# Patient Record
Sex: MALE | Race: BLACK OR AFRICAN AMERICAN | NOT HISPANIC OR LATINO | Employment: STUDENT | URBAN - NONMETROPOLITAN AREA
[De-identification: names, ages, dates, MRNs, and addresses within clinical notes are randomized per-mention and may not be internally consistent; named-entity substitution may affect disease eponyms.]

---

## 2018-02-10 ENCOUNTER — OFFICE VISIT (OUTPATIENT)
Dept: RETAIL CLINIC | Facility: CLINIC | Age: 23
End: 2018-02-10

## 2018-02-10 VITALS
OXYGEN SATURATION: 94 % | RESPIRATION RATE: 22 BRPM | TEMPERATURE: 100.5 F | HEART RATE: 88 BPM | DIASTOLIC BLOOD PRESSURE: 60 MMHG | WEIGHT: 178 LBS | SYSTOLIC BLOOD PRESSURE: 102 MMHG

## 2018-02-10 DIAGNOSIS — J20.9 ACUTE BRONCHITIS, UNSPECIFIED ORGANISM: ICD-10-CM

## 2018-02-10 DIAGNOSIS — R68.89 FLU-LIKE SYMPTOMS: Primary | ICD-10-CM

## 2018-02-10 PROCEDURE — 99212 OFFICE O/P EST SF 10 MIN: CPT | Performed by: NURSE PRACTITIONER

## 2018-02-10 NOTE — PROGRESS NOTES
Chief Complaint   Patient presents with   • URI     Subjective   Tyler Majano is a 23 y.o. male who presents to the clinic today with complaints cough and fever. He started getting sick on Monday, but now worse.   URI    This is a new problem. The current episode started in the past 7 days. The problem has been gradually worsening. The maximum temperature recorded prior to his arrival was 100.4 - 100.9 F. Associated symptoms include chest pain (burning sensation with cough. ), congestion (nasal), coughing, headaches, a sore throat and wheezing. Pertinent negatives include no abdominal pain, diarrhea, dysuria, ear pain, joint pain, joint swelling, nausea, neck pain, plugged ear sensation, rash, rhinorrhea, sneezing, swollen glands or vomiting. He has tried decongestant and acetaminophen for the symptoms. The treatment provided mild relief.       No current outpatient prescriptions on file.    Allergies:  Review of patient's allergies indicates no known allergies.    History reviewed. No pertinent past medical history.  History reviewed. No pertinent surgical history.  Family History   Problem Relation Age of Onset   • No Known Problems Mother    • No Known Problems Father    • No Known Problems Sister      Social History   Substance Use Topics   • Smoking status: Passive Smoke Exposure - Never Smoker   • Smokeless tobacco: Never Used      Comment: Cooking smoke   • Alcohol use No       Review of Systems  Review of Systems   HENT: Positive for congestion (nasal) and sore throat. Negative for ear pain, rhinorrhea and sneezing.    Respiratory: Positive for cough and wheezing.    Cardiovascular: Positive for chest pain (burning sensation with cough. ).   Gastrointestinal: Negative for abdominal pain, diarrhea, nausea and vomiting.   Genitourinary: Negative for dysuria.   Musculoskeletal: Negative for joint pain and neck pain.   Skin: Negative for rash.   Neurological: Positive for headaches.       Objective    Pulse 88  Temp 100.5 °F (38.1 °C) (Tympanic)   Resp 22  Wt 80.7 kg (178 lb)  SpO2 94%      Physical Exam   Constitutional: He appears well-developed and well-nourished.   Unsteady gait and weakness. Ill appearing.    HENT:   Head: Normocephalic and atraumatic.   Right Ear: Hearing, tympanic membrane, external ear and ear canal normal.   Left Ear: Hearing, tympanic membrane, external ear and ear canal normal.   Nose: Mucosal edema present. Right sinus exhibits no maxillary sinus tenderness and no frontal sinus tenderness. Left sinus exhibits no maxillary sinus tenderness and no frontal sinus tenderness.   Mouth/Throat: Uvula is midline and mucous membranes are normal. Posterior oropharyngeal erythema present. No oropharyngeal exudate, posterior oropharyngeal edema or tonsillar abscesses. Tonsils are 1+ on the right. Tonsils are 1+ on the left. No tonsillar exudate.   Eyes: EOM are normal. Pupils are equal, round, and reactive to light.   Neck: Normal range of motion. Neck supple.   Cardiovascular: Normal rate, regular rhythm and normal heart sounds.  Exam reveals no gallop and no friction rub.    No murmur heard.  Pulmonary/Chest: Effort normal. No stridor. No respiratory distress. He has wheezes. He has no rales. He exhibits no tenderness.   Lymphadenopathy:     He has no cervical adenopathy.   Neurological: He is alert.   Skin: Skin is warm and dry.   Psychiatric: He has a normal mood and affect. His behavior is normal.   Vitals reviewed.      Assessment/Plan     Tyler was seen today for uri.    Diagnoses and all orders for this visit:    Flu-like symptoms    Acute bronchitis, unspecified organism    Difficulty obtaining oxygen sats with first reading 94% and then 90% after albuterol treatment. He was weak and unsteady with walking out of clinic. Spoke with both patient and his friend. They are concerned because he has no insurance. I told them he is too sick for treatment here. He can take him to emergency  room or go by ambulance. They continue to be concerned regarding payment. I have discussed payment arrangements can be made and since he is a student on limited income there is help available to offset his medical costs. If they have difficulty with filling out the paper work or finding out how to apply they can come back to the clinic and I will help them with this.   His friend assures me he will take him to the emergency room.

## 2018-02-28 ENCOUNTER — DOCUMENTATION (OUTPATIENT)
Dept: RETAIL CLINIC | Facility: CLINIC | Age: 23
End: 2018-02-28